# Patient Record
Sex: MALE | Race: WHITE
[De-identification: names, ages, dates, MRNs, and addresses within clinical notes are randomized per-mention and may not be internally consistent; named-entity substitution may affect disease eponyms.]

---

## 2021-05-11 ENCOUNTER — HOSPITAL ENCOUNTER (EMERGENCY)
Dept: HOSPITAL 26 - MED | Age: 21
Discharge: HOME | End: 2021-05-11
Payer: COMMERCIAL

## 2021-05-11 VITALS — BODY MASS INDEX: 32.02 KG/M2 | HEIGHT: 67 IN | WEIGHT: 204 LBS

## 2021-05-11 VITALS — SYSTOLIC BLOOD PRESSURE: 150 MMHG | DIASTOLIC BLOOD PRESSURE: 74 MMHG

## 2021-05-11 VITALS — DIASTOLIC BLOOD PRESSURE: 82 MMHG | SYSTOLIC BLOOD PRESSURE: 152 MMHG

## 2021-05-11 DIAGNOSIS — L50.0: Primary | ICD-10-CM

## 2021-05-11 PROCEDURE — 96372 THER/PROPH/DIAG INJ SC/IM: CPT

## 2021-05-11 PROCEDURE — 99283 EMERGENCY DEPT VISIT LOW MDM: CPT

## 2021-05-11 NOTE — NUR
Patient appears to be resting comfortably in bed. Vital Signs within normal 
limits. Respirations even and unlabored. No wheezing or SOB noted. Patient 
denies difficulty swallowing at this time.

## 2021-05-11 NOTE — NUR
PATIENT 20 Y/O MALE BIB SELF FOR C/O ALLERGIC REACTION X 2 HOURS AGO. PATIENT 
A&O X 4. PATIENT PRESETNS CALM WITH NO SOB OR WHEEZING NOTED. PATIENT DENIES 
DIFFICULTY SWALLOWING. PATIENT NOTED WITH HIVES ON A/P TORSE AND BUE AND BLE. 
PATIENT STATES TOOK LORATADINE 10MG X 1 HOUR AGO. PATIENT DENIES PAIN AND 
STATES, "IT'S JUST ITCHY." PATIENT DENIES N/V. PATIENT VSS. 



MEDHX: ASTHMA

NKA

## 2021-05-11 NOTE — NUR
Patient states, "The itching feels so much better. The medication helped a lot. 
" Hives still noted on patient BUE and A/P torso. Patient denies pain at this 
time.

## 2021-05-11 NOTE — NUR
Patient is currently ambulatory with steady gait, able to walk unassisted.  
Positive gag reflex.  Alert and oriented.  Is not driving self for discharge 
out of facility. NADR to [facility]. Patient discharged with v/s stable. Written 
and verbal after care instructions given and explained. 

Patient verbalized understanding.  All questions addressed prior to discharge. 
Advised to follow up with PMD.